# Patient Record
Sex: MALE
[De-identification: names, ages, dates, MRNs, and addresses within clinical notes are randomized per-mention and may not be internally consistent; named-entity substitution may affect disease eponyms.]

---

## 2021-10-13 ENCOUNTER — NON-APPOINTMENT (OUTPATIENT)
Age: 46
End: 2021-10-13

## 2021-10-14 ENCOUNTER — APPOINTMENT (OUTPATIENT)
Dept: GASTROENTEROLOGY | Facility: CLINIC | Age: 46
End: 2021-10-14
Payer: COMMERCIAL

## 2021-10-14 VITALS
HEIGHT: 68 IN | HEART RATE: 50 BPM | SYSTOLIC BLOOD PRESSURE: 111 MMHG | WEIGHT: 185 LBS | BODY MASS INDEX: 28.04 KG/M2 | DIASTOLIC BLOOD PRESSURE: 74 MMHG

## 2021-10-14 DIAGNOSIS — Z80.0 ENCOUNTER FOR SCREENING FOR MALIGNANT NEOPLASM OF COLON: ICD-10-CM

## 2021-10-14 DIAGNOSIS — Z12.11 ENCOUNTER FOR SCREENING FOR MALIGNANT NEOPLASM OF COLON: ICD-10-CM

## 2021-10-14 PROCEDURE — 99202 OFFICE O/P NEW SF 15 MIN: CPT

## 2021-10-14 RX ORDER — SODIUM SULFATE, POTASSIUM SULFATE, MAGNESIUM SULFATE 17.5; 3.13; 1.6 G/ML; G/ML; G/ML
17.5-3.13-1.6 SOLUTION, CONCENTRATE ORAL
Qty: 1 | Refills: 0 | Status: ACTIVE | COMMUNITY
Start: 2021-10-14 | End: 1900-01-01

## 2021-10-14 NOTE — PHYSICAL EXAM
[General Appearance - Alert] : alert [General Appearance - In No Acute Distress] : in no acute distress [Sclera] : the sclera and conjunctiva were normal [PERRL With Normal Accommodation] : pupils were equal in size, round, and reactive to light [Extraocular Movements] : extraocular movements were intact [Outer Ear] : the ears and nose were normal in appearance [Oropharynx] : the oropharynx was normal [Neck Appearance] : the appearance of the neck was normal [Neck Cervical Mass (___cm)] : no neck mass was observed [Jugular Venous Distention Increased] : there was no jugular-venous distention [Thyroid Diffuse Enlargement] : the thyroid was not enlarged [Thyroid Nodule] : there were no palpable thyroid nodules [Auscultation Breath Sounds / Voice Sounds] : lungs were clear to auscultation bilaterally [Heart Sounds] : normal S1 and S2 [Heart Rate And Rhythm] : heart rate was normal and rhythm regular [Heart Sounds Gallop] : no gallops [Murmurs] : no murmurs [Heart Sounds Pericardial Friction Rub] : no pericardial rub [Bowel Sounds] : normal bowel sounds [Abdomen Soft] : soft [] : no hepato-splenomegaly [Abdomen Tenderness] : non-tender [Abdomen Mass (___ Cm)] : no abdominal mass palpated

## 2021-10-14 NOTE — HISTORY OF PRESENT ILLNESS
[FreeTextEntry1] : Visit history of colon cancer in his father with a unusual presentation of a polyp in a diverticulum and surgical resection resulted in the finding of metastatic colon cancer. The patient denies any significant medical issues. No change of bowel habits. No rectal bleeding and his mother has endometrial cancer

## 2021-10-14 NOTE — ASSESSMENT
[FreeTextEntry1] : Family history of colon cancer and mother with endometrial, cancer, we'll schedule colonoscopy with a Suprep

## 2021-10-27 DIAGNOSIS — Z01.818 ENCOUNTER FOR OTHER PREPROCEDURAL EXAMINATION: ICD-10-CM

## 2021-10-29 ENCOUNTER — APPOINTMENT (OUTPATIENT)
Dept: DISASTER EMERGENCY | Facility: CLINIC | Age: 46
End: 2021-10-29

## 2021-10-30 LAB — SARS-COV-2 N GENE NPH QL NAA+PROBE: NOT DETECTED

## 2021-11-01 ENCOUNTER — APPOINTMENT (OUTPATIENT)
Dept: GASTROENTEROLOGY | Facility: AMBULATORY MEDICAL SERVICES | Age: 46
End: 2021-11-01
Payer: COMMERCIAL

## 2021-11-01 PROCEDURE — 45378 DIAGNOSTIC COLONOSCOPY: CPT

## 2022-09-12 ENCOUNTER — APPOINTMENT (OUTPATIENT)
Dept: ORTHOPEDIC SURGERY | Facility: CLINIC | Age: 47
End: 2022-09-12

## 2022-09-12 VITALS — WEIGHT: 185 LBS | HEIGHT: 68 IN | BODY MASS INDEX: 28.04 KG/M2

## 2022-09-12 DIAGNOSIS — Z00.00 ENCOUNTER FOR GENERAL ADULT MEDICAL EXAMINATION W/OUT ABNORMAL FINDINGS: ICD-10-CM

## 2022-09-12 DIAGNOSIS — M25.512 PAIN IN LEFT SHOULDER: ICD-10-CM

## 2022-09-12 DIAGNOSIS — Z78.9 OTHER SPECIFIED HEALTH STATUS: ICD-10-CM

## 2022-09-12 DIAGNOSIS — M19.012 PRIMARY OSTEOARTHRITIS, LEFT SHOULDER: ICD-10-CM

## 2022-09-12 PROCEDURE — 99203 OFFICE O/P NEW LOW 30 MIN: CPT

## 2022-09-12 PROCEDURE — 73030 X-RAY EXAM OF SHOULDER: CPT | Mod: LT

## 2022-09-14 PROBLEM — M25.512 PAIN OF LEFT STERNOCLAVICULAR JOINT: Status: ACTIVE | Noted: 2022-09-12

## 2022-09-14 PROBLEM — M19.012 GLENOHUMERAL ARTHRITIS, LEFT: Status: ACTIVE | Noted: 2022-09-12

## 2022-09-14 NOTE — DISCUSSION/SUMMARY
[de-identified] : The documentation recorded by the scribe accurately reflects the service I personally performed and the decisions made by me.\par I, Janie Pena, attest that this documentation has been prepared under the direction and in the presence of Provider Reymundo Rudolph MD.\par \par The patient was seen by Reymundo Rudolph MD.\par The following radiographs were ordered and read by me during this patient's visit. I reviewed each radiograph in detail with the patient and parent, and discussed the findings as highlighted below.\par

## 2022-09-14 NOTE — PHYSICAL EXAM
[Standing] : standing [Mild] : mild [5 ___] : forward flexion 5[unfilled]/5 [5___] : internal rotation 5[unfilled]/5 [Left] : left shoulder [Degenerative change] : Degenerative change [] : negative Speed's [FreeTextEntry9] : \par ER 40\par IR  T12 [FreeTextEntry1] : small inferior humeral spur

## 2022-09-14 NOTE — ASSESSMENT
[FreeTextEntry1] : Underlying pathology reviewed and treatment options discussed. \par Start PT and HEP to improve mechanics and reduce pain.\par Activity modifier as tolerated.\par nsaids prn for OA\par If no improvement consider injection therapy. or further imaging. \par Questions addressed.\par \par

## 2022-09-14 NOTE — HISTORY OF PRESENT ILLNESS
[Gradual] : gradual [0] : 0 [Localized] : localized [Sleep] : sleep [Nothing helps with pain getting better] : Nothing helps with pain getting better [Full time] : Work status: full time [de-identified] : 9/12/22: 47 year old RHD M with intermittent L shoulder pain, "popping" sensation increased over the past month with motion and adl's. Worse lying on his L shoulder, wakes him qhs. Some stiffness in the am.  Exacerbates lifting weights - exercising. No injury or intervention. Radiates to collarbone. PT in the past for impingment 1 1/2 years ago.\par \par pmh: non contributory\par \par Work: Desk worker - [employer] [] : no [FreeTextEntry5] : shoulder pain for a month after waking up sleeping on that side [FreeTextEntry6] : grinding and popping [de-identified] : over use [de-identified] : in the past [de-identified] : [employer]

## 2022-10-24 ENCOUNTER — APPOINTMENT (OUTPATIENT)
Dept: ORTHOPEDIC SURGERY | Facility: CLINIC | Age: 47
End: 2022-10-24

## 2022-11-07 ENCOUNTER — APPOINTMENT (OUTPATIENT)
Dept: ORTHOPEDIC SURGERY | Facility: CLINIC | Age: 47
End: 2022-11-07

## 2024-06-07 ENCOUNTER — APPOINTMENT (OUTPATIENT)
Dept: ORTHOPEDIC SURGERY | Facility: CLINIC | Age: 49
End: 2024-06-07
Payer: COMMERCIAL

## 2024-06-07 VITALS — BODY MASS INDEX: 26.52 KG/M2 | WEIGHT: 175 LBS | HEIGHT: 68 IN

## 2024-06-07 DIAGNOSIS — Z78.9 OTHER SPECIFIED HEALTH STATUS: ICD-10-CM

## 2024-06-07 DIAGNOSIS — S39.012A STRAIN OF MUSCLE, FASCIA AND TENDON OF LOWER BACK, INITIAL ENCOUNTER: ICD-10-CM

## 2024-06-07 PROCEDURE — 72100 X-RAY EXAM L-S SPINE 2/3 VWS: CPT

## 2024-06-07 PROCEDURE — 99203 OFFICE O/P NEW LOW 30 MIN: CPT

## 2024-06-10 NOTE — DATA REVIEWED
[FreeTextEntry1] : On my interpretations of these images from [facility] in house on 06/07/2024: I have independently reviewed and interpreted these images. 2 V lumbar XR- mild DDD L3-5.

## 2024-06-10 NOTE — HISTORY OF PRESENT ILLNESS
[5] : 5 [Localized] : localized [Sharp] : sharp [Shooting] : shooting [Stabbing] : stabbing [Tightness] : tightness [Constant] : constant [Rest] : rest [Bending forward] : bending forward [Full time] : Work status: full time [de-identified] : 06/07/2024:  48 Y M presenting today for an initial evaluation. Hx of intermittent chronic back pains. 2 days of RT sided low back & buttock pain. Improved but still symptomatic. No radicular symptoms to entire LE.  Never has experienced severe episode to this one. Treated with heat and OTC IBU and states mild relief. Pt served in [employer] for 22 years. He is working at this time as a  for [employer].  [] : no [FreeTextEntry9] : hot shower [de-identified] : twisting, certain movements [de-identified] : 6/5/24 (1/2 day) [de-identified] :  for [employer]

## 2024-06-10 NOTE — DISCUSSION/SUMMARY
[de-identified] : 48 Y M w/ low back strain.  Patient was educated and informed on their condition along with the expected outcomes. Declined TPI and MDP today. He will manage with OTC NSAIDs, I've advised him on 800mg IBU usage. Patient was provided with a referral for lumbar physical therapy to work on stretching, strengthening and range of motion. Patient was provided with a lumbar home exercise program.   Moving forward I'd like to see as needed.   Prior to appointment and during encounter with patient extensive medical records were reviewed including but not limited to, hospital records, out patient records, imaging results, and lab data. During this appointment the patient was examined, diagnoses were discussed and explained in a face to face manner. In addition extensive time was spent reviewing aforementioned diagnostic studies. Counseling including abnormal image results, differential diagnoses, treatment options, risk and benefits, lifestyle changes, current condition, and current medications was performed. Patient's comments, questions, and concerns were address and patient verbalized understanding. Based on this patient's presentation at our office, which is an orthopedic spine surgeon's office, this patient inherently / intrinsically has a risk, however minute, of developing issues such as Cauda equina syndrome, bowel and bladder changes, or progression of motor or neurological deficits such as paralysis which may be permanent.   I, Rand Donaldson, attest that this documentation has been prepared under the direction and in the presence of provider Jose Mensah MD.

## 2024-06-10 NOTE — PHYSICAL EXAM
[Flexion] : flexion [Extension] : extension [Bending to left] : bending to left [Bending to right] : bending to right [de-identified] : Constitutional: - General Appearance: Unremarkable Body Habitus Well Developed Well Nourished Body Habitus No Deformities Well Groomed Ability To communicate: Normal Neurologic: Global sensation is intact to upper and lower extremities. See examination of Neck and/or Spine for exceptions. Orientation to Time, Place and Person is: Normal Mood And Affect is Normal Skin: - Head/Face, Right Upper/Lower Extremity, Left Upper/Lower Extremity: Normal See Examination of Neck and/or Spine for exceptions Cardiovascular: Peripheral Cardiovascular System is Normal Palpation of Lymph Nodes: Normal Palpation of lymph nodes in: Axilla, Cervical, Inguinal Abnormal Palpation of lymph nodes in: None  [] : non-antalgic [de-identified] : heel & toe walk normal.  [FreeTextEntry8] : L4-S1 paraspinal RT tenderness. [FreeTextEntry9] : Pt is guarding.  [TWNoteComboBox7] : forward flexion 45 degrees [de-identified] : extension 20 degrees [de-identified] : left lateral bending 30 degrees [de-identified] : right lateral bending 15 degrees [TWNoteComboBox6] : False

## 2024-09-19 ENCOUNTER — TRANSCRIPTION ENCOUNTER (OUTPATIENT)
Age: 49
End: 2024-09-19

## 2024-09-23 ENCOUNTER — TRANSCRIPTION ENCOUNTER (OUTPATIENT)
Age: 49
End: 2024-09-23

## 2024-10-02 ENCOUNTER — APPOINTMENT (OUTPATIENT)
Dept: ORTHOPEDIC SURGERY | Facility: CLINIC | Age: 49
End: 2024-10-02
Payer: COMMERCIAL

## 2024-10-02 DIAGNOSIS — M54.50 LOW BACK PAIN, UNSPECIFIED: ICD-10-CM

## 2024-10-02 DIAGNOSIS — S39.012A STRAIN OF MUSCLE, FASCIA AND TENDON OF LOWER BACK, INITIAL ENCOUNTER: ICD-10-CM

## 2024-10-02 DIAGNOSIS — G89.29 LOW BACK PAIN, UNSPECIFIED: ICD-10-CM

## 2024-10-02 PROCEDURE — 99213 OFFICE O/P EST LOW 20 MIN: CPT

## 2024-10-05 NOTE — HISTORY OF PRESENT ILLNESS
[5] : 5 [Localized] : localized [Sharp] : sharp [Shooting] : shooting [Stabbing] : stabbing [Tightness] : tightness [Constant] : constant [Rest] : rest [Bending forward] : bending forward [Full time] : Work status: full time [de-identified] : 10/02/2024: Patient presenting today for a FUV.  Pt reports lumbar tightness in morning. Has been treating with HEP. Has been treating with lumbar PT, no specific relief. Back pain is localized, no radic pains.  No pain, numbness, tingling or weakness in the lower extremities b/l.   06/07/2024:  48 Y M presenting today for an initial evaluation. Hx of intermittent chronic back pains. 2 days of RT sided low back & buttock pain. Improved but still symptomatic. No radicular symptoms to entire LE.  Never has experienced severe episode to this one. Treated with heat and OTC IBU and states mild relief. Pt served in [employer] for 22 years. He is working at this time as a  for [employer].  [] : no [FreeTextEntry9] : hot shower [de-identified] : 6/5/24 (1/2 day) [de-identified] : twisting, certain movements [de-identified] :  for [employer]

## 2024-10-05 NOTE — REVIEW OF SYSTEMS
[Joint Pain] : joint pain [Joint Stiffness] : joint stiffness [Negative] : Heme/Lymph [FreeTextEntry9] : l-spine

## 2024-10-05 NOTE — HISTORY OF PRESENT ILLNESS
[5] : 5 [Localized] : localized [Sharp] : sharp [Shooting] : shooting [Stabbing] : stabbing [Tightness] : tightness [Constant] : constant [Rest] : rest [Bending forward] : bending forward [Full time] : Work status: full time [de-identified] : 10/02/2024: Patient presenting today for a FUV.  Pt reports lumbar tightness in morning. Has been treating with HEP. Has been treating with lumbar PT, no specific relief. Back pain is localized, no radic pains.  No pain, numbness, tingling or weakness in the lower extremities b/l.   06/07/2024:  48 Y M presenting today for an initial evaluation. Hx of intermittent chronic back pains. 2 days of RT sided low back & buttock pain. Improved but still symptomatic. No radicular symptoms to entire LE.  Never has experienced severe episode to this one. Treated with heat and OTC IBU and states mild relief. Pt served in [employer] for 22 years. He is working at this time as a  for [employer].  [] : no [FreeTextEntry9] : hot shower [de-identified] : twisting, certain movements [de-identified] : 6/5/24 (1/2 day) [de-identified] :  for [employer]

## 2024-10-05 NOTE — DISCUSSION/SUMMARY
[de-identified] : 48 Y M w/ chronic low back strain.  Patient refused examination after he noted that the sink faucet was dirty and I subsequently cleaned it.  Was concerned about "contamination"  Patient was provided with a referral for lumbar physical therapy to work on stretching, strengthening and range of motion. Patient was provided with a lumbar home exercise program.  As the patient has been refractory to PT, and NSAID therapy >4 months, I am requesting the patient obtain a lumbar MRI to evaluate for HNP vs stenosis.   F/U in 1-2 WKS.   Prior to appointment and during encounter with patient extensive medical records were reviewed including but not limited to, hospital records, out patient records, imaging results, and lab data. During this appointment the patient was examined, diagnoses were discussed and explained in a face to face manner. In addition extensive time was spent reviewing aforementioned diagnostic studies. Counseling including abnormal image results, differential diagnoses, treatment options, risk and benefits, lifestyle changes, current condition, and current medications was performed. Patient's comments, questions, and concerns were address and patient verbalized understanding. Based on this patient's presentation at our office, which is an orthopedic spine surgeon's office, this patient inherently / intrinsically has a risk, however minute, of developing issues such as Cauda equina syndrome, bowel and bladder changes, or progression of motor or neurological deficits such as paralysis which may be permanent.   I, Rand Donaldson, attest that this documentation has been prepared under the direction and in the presence of provider Jose Mensah MD.

## 2024-10-05 NOTE — PHYSICAL EXAM
[de-identified] : Constitutional: - General Appearance: Unremarkable Body Habitus Well Developed Well Nourished Body Habitus No Deformities Well Groomed Ability To communicate: Normal Neurologic: Global sensation is intact to upper and lower extremities. See examination of Neck and/or Spine for exceptions. Orientation to Time, Place and Person is: Normal Mood And Affect is Normal Skin: - Head/Face, Right Upper/Lower Extremity, Left Upper/Lower Extremity: Normal See Examination of Neck and/or Spine for exceptions Cardiovascular: Peripheral Cardiovascular System is Normal Palpation of Lymph Nodes: Normal Palpation of lymph nodes in: Axilla, Cervical, Inguinal Abnormal Palpation of lymph nodes in: None

## 2024-10-05 NOTE — PHYSICAL EXAM
[de-identified] : Constitutional: - General Appearance: Unremarkable Body Habitus Well Developed Well Nourished Body Habitus No Deformities Well Groomed Ability To communicate: Normal Neurologic: Global sensation is intact to upper and lower extremities. See examination of Neck and/or Spine for exceptions. Orientation to Time, Place and Person is: Normal Mood And Affect is Normal Skin: - Head/Face, Right Upper/Lower Extremity, Left Upper/Lower Extremity: Normal See Examination of Neck and/or Spine for exceptions Cardiovascular: Peripheral Cardiovascular System is Normal Palpation of Lymph Nodes: Normal Palpation of lymph nodes in: Axilla, Cervical, Inguinal Abnormal Palpation of lymph nodes in: None

## 2024-10-05 NOTE — DISCUSSION/SUMMARY
[de-identified] : 48 Y M w/ chronic low back strain.  Patient refused examination after he noted that the sink faucet was dirty and I subsequently cleaned it.  Was concerned about "contamination"  Patient was provided with a referral for lumbar physical therapy to work on stretching, strengthening and range of motion. Patient was provided with a lumbar home exercise program.  As the patient has been refractory to PT, and NSAID therapy >4 months, I am requesting the patient obtain a lumbar MRI to evaluate for HNP vs stenosis.   F/U in 1-2 WKS.   Prior to appointment and during encounter with patient extensive medical records were reviewed including but not limited to, hospital records, out patient records, imaging results, and lab data. During this appointment the patient was examined, diagnoses were discussed and explained in a face to face manner. In addition extensive time was spent reviewing aforementioned diagnostic studies. Counseling including abnormal image results, differential diagnoses, treatment options, risk and benefits, lifestyle changes, current condition, and current medications was performed. Patient's comments, questions, and concerns were address and patient verbalized understanding. Based on this patient's presentation at our office, which is an orthopedic spine surgeon's office, this patient inherently / intrinsically has a risk, however minute, of developing issues such as Cauda equina syndrome, bowel and bladder changes, or progression of motor or neurological deficits such as paralysis which may be permanent.   I, Rand Donaldson, attest that this documentation has been prepared under the direction and in the presence of provider Jose Mensah MD.

## 2024-10-08 ENCOUNTER — RESULT REVIEW (OUTPATIENT)
Age: 49
End: 2024-10-08

## 2024-10-16 ENCOUNTER — APPOINTMENT (OUTPATIENT)
Dept: ORTHOPEDIC SURGERY | Facility: CLINIC | Age: 49
End: 2024-10-16
Payer: COMMERCIAL

## 2024-10-16 DIAGNOSIS — M54.50 LOW BACK PAIN, UNSPECIFIED: ICD-10-CM

## 2024-10-16 DIAGNOSIS — M51.360: ICD-10-CM

## 2024-10-16 DIAGNOSIS — G89.29 LOW BACK PAIN, UNSPECIFIED: ICD-10-CM

## 2024-10-16 PROCEDURE — 99442: CPT | Mod: 93

## 2025-01-03 ENCOUNTER — TRANSCRIPTION ENCOUNTER (OUTPATIENT)
Age: 50
End: 2025-01-03

## 2025-02-24 ENCOUNTER — APPOINTMENT (OUTPATIENT)
Dept: ORTHOPEDIC SURGERY | Facility: CLINIC | Age: 50
End: 2025-02-24